# Patient Record
(demographics unavailable — no encounter records)

---

## 2024-11-11 NOTE — REVIEW OF SYSTEMS
[Recent Change In Weight] : ~T recent weight change [Negative] : Heme/Lymph [Fever] : no fever [Chills] : no chills [Fatigue] : no fatigue [FreeTextEntry2] : gained some wgt in past but now losing wgt again (wgt got to 173 on home scale and home scale ths am said 164) ; on GLP1 agonist med [FreeTextEntry8] : +vag dryness, has Rx for vag estrogen cream and used in past when was rx'ed and did not think did much but perhaps did not use as consistently as should so will retry and also will f/u with Dr. Rodriguez in near future for exam

## 2024-11-11 NOTE — ASSESSMENT
[FreeTextEntry1] : ANN-MARIE FONSECA is a 65 year old female here for a physical exam.  She is also here to follow up on medical issues as noted above.  Ann-Marie has HTN, h/o bariatric surgery, intermittent mild leukopenia, osteopenia, low vit D.

## 2024-11-11 NOTE — HISTORY OF PRESENT ILLNESS
[FreeTextEntry1] : JIMMY FONSECA is a 65 year old female here for a physical exam. [de-identified] : Her last physical exam was last year  Vaccines: Tetanus is up to date, Tdap 10/2023 Pneumococcal vaccination is due now that she is 65 yrs of age Shingrix is up to date  Her last dentist visit was within past 6-12 months Her last eye doctor appointment was less than one year ago Her last dermatologist visit was less than one year ago  GYN visit is up to date though due again now, 11/2023, [facility] Mammogram is up to date, 12/2023 stable/benign mammo/US Colon cancer screening is up to date, colonoscopy 2/2023 polyp, rpt at 3 yrs (2/2026), Dr. Curry DEXA is NOT up to date, 2/2022 -1.1 hip (tot and fem neck) lowest T score  Her diet is healthy overall Exercise: [place] [employer], strength training 2x/wk, walking daily   Ann-Marie has HTN, h/o bariatric surgery, intermittent mild leukopenia, osteopenia, low vit D.  She sees Dr. Dalton periodically for eval and testing (last in 2020 she thinks). Baseline ECG PRWP, SB. She is UTD on follow up with Dr. Polk.  At her visit 10/2023  HDL 80 10 yr ASCVD risk est 6.67%. Recommended Mediterranean style eating and exercise. And rec nonurgent eval with Dr. Dalton to update testing and clarify/inform LDL goal/lipid treatment plan. SHe is planning to see her in 2025 sometime  She used Phentermine in 2023 for assistance with wgt loss. Rx'ed by Dr. Polk. BP/HR were higher while on Phentermine. In 12/15/2023 we Increased her Losartan to 50 BID as BPs were elevated and added amlodipine; she has been taking as 50 mg x 2 tabs QAM along with Amlodipine QAM and we are discussing trying to shift some of her BP med to evening. She stopped phentermine in Fall 2023.   Found weight was steadily increasing despite efforts with cleane eating/exercise, nutrition consultation etc. Was concerned about all the wgt she had lost in the past coming back on. She was constantly thinking about food. Saw Dr. Polk and started  GLP1 agonist med, compounded semaglutide through Dr. Polk's office. Will be changing back to Tirzepatide again this week as that is more effective for her. Tolerating well other than maybe a hint of heartburn sometimes but resolves quickly without need for meds

## 2024-11-11 NOTE — PLAN
[FreeTextEntry1] : Continue all medications as prescribed; she will try taking Losartan 50 mg BID instead of 100 mg all at once. Check labs as above. Will adjust any medications based upon lab results.  Reviewed age-appropriate preventive screening tests with patient. UTD but due again now for gyn visit and she will schedule for near future. UTD but due again soon for mammogram and she will schedule for near future. UTD on colonoscopy screening. Due for DEXA screening and will schedule for near future.   Reviewed/recommended annual flu vaccine, and PCV 20 vaccine. She consents to reg strength flu vacc this year, amd PCV 20 today   ECG shows SR, first degree Av block, old anteroseptal infacrt pattern, stable from prior ECGs  She will plan to see Dr. Dalton nonurgently in 2025 for updated eval given card RFs and has been 5 yrs since last eval. NO current card sxs.   BP goal is <=135/85 on average on home checks. Advised to let us know if BPs are above goal on home checks.   Lifestyle measures to optimize BP reviewed, including regular exercise, adequate sleep, Mediterranean or DASH style clean eating, mindfulness/meditation, magnesium 100-400 mg supplementation, avoid NSAIDS, stress management techniques etc.  Discussed clean eating (eg Mediterranean style plant based eating plan) and regular exercise/staying as physically active as possible.  Include balance exercises and strength training and core strengthening exercises for bone health and to decrease risk for falls.  Recommended calcium 6996-0556 mg daily ideally mainly or fully from food sources +/- supplement if needed, vit D 7888-2329 IU or whatever dose is needed to get D into 30-80 range. Recommended regular weight bearing exercise as well as strength training exercise 3 or more times per week and balance exercises regularly.  Reviewed importance of good self care (e.g. meditation, yoga, adequate rest, regular exercise, magnesium, clean eating, etc.).  Follow up with specialists as recommended by them.   Follow up for next physical in one year. Schedule RPA visit (HTN etc) for about 6 month and rpt labs at that time.

## 2024-11-11 NOTE — HISTORY OF PRESENT ILLNESS
[FreeTextEntry1] : JIMMY FONSECA is a 65 year old female here for a physical exam. [de-identified] : Her last physical exam was last year  Vaccines: Tetanus is up to date, Tdap 10/2023 Pneumococcal vaccination is due now that she is 65 yrs of age Shingrix is up to date  Her last dentist visit was within past 6-12 months Her last eye doctor appointment was less than one year ago Her last dermatologist visit was less than one year ago  GYN visit is up to date though due again now, 11/2023, [facility] Mammogram is up to date, 12/2023 stable/benign mammo/US Colon cancer screening is up to date, colonoscopy 2/2023 polyp, rpt at 3 yrs (2/2026), Dr. Curry DEXA is NOT up to date, 2/2022 -1.1 hip (tot and fem neck) lowest T score  Her diet is healthy overall Exercise: [place] [employer], strength training 2x/wk, walking daily   Ann-Marie has HTN, h/o bariatric surgery, intermittent mild leukopenia, osteopenia, low vit D.  She sees Dr. Dalton periodically for eval and testing (last in 2020 she thinks). Baseline ECG PRWP, SB. She is UTD on follow up with Dr. Polk.  At her visit 10/2023  HDL 80 10 yr ASCVD risk est 6.67%. Recommended Mediterranean style eating and exercise. And rec nonurgent eval with Dr. Dalton to update testing and clarify/inform LDL goal/lipid treatment plan. SHe is planning to see her in 2025 sometime  She used Phentermine in 2023 for assistance with wgt loss. Rx'ed by Dr. Polk. BP/HR were higher while on Phentermine. In 12/15/2023 we Increased her Losartan to 50 BID as BPs were elevated and added amlodipine; she has been taking as 50 mg x 2 tabs QAM along with Amlodipine QAM and we are discussing trying to shift some of her BP med to evening. She stopped phentermine in Fall 2023.   Found weight was steadily increasing despite efforts with cleane eating/exercise, nutrition consultation etc. Was concerned about all the wgt she had lost in the past coming back on. She was constantly thinking about food. Saw Dr. Polk and started  GLP1 agonist med, compounded semaglutide through Dr. Polk's office. Will be changing back to Tirzepatide again this week as that is more effective for her. Tolerating well other than maybe a hint of heartburn sometimes but resolves quickly without need for meds

## 2024-11-11 NOTE — HEALTH RISK ASSESSMENT
[No falls in past year] : Patient reported no falls in the past year [0] : 2) Feeling down, depressed, or hopeless: Not at all (0) [PHQ-2 Negative - No further assessment needed] : PHQ-2 Negative - No further assessment needed [Never] : Never [[id number]] : 0

## 2024-11-11 NOTE — PHYSICAL EXAM
[No Acute Distress] : no acute distress [Well Developed] : well developed [Well-Appearing] : well-appearing [Normal Sclera/Conjunctiva] : normal sclera/conjunctiva [EOMI] : extraocular movements intact [Normal Outer Ear/Nose] : the outer ears and nose were normal in appearance [Normal Oropharynx] : the oropharynx was normal [No JVD] : no jugular venous distention [No Lymphadenopathy] : no lymphadenopathy [Supple] : supple [Thyroid Normal, No Nodules] : the thyroid was normal and there were no nodules present [No Respiratory Distress] : no respiratory distress  [No Accessory Muscle Use] : no accessory muscle use [Clear to Auscultation] : lungs were clear to auscultation bilaterally [Normal Rate] : normal rate  [Regular Rhythm] : with a regular rhythm [Normal S1, S2] : normal S1 and S2 [No Carotid Bruits] : no carotid bruits [Pedal Pulses Present] : the pedal pulses are present [No Varicosities] : no varicosities [No Edema] : there was no peripheral edema [No Extremity Clubbing/Cyanosis] : no extremity clubbing/cyanosis [Soft] : abdomen soft [Non Tender] : non-tender [Non-distended] : non-distended [No Masses] : no abdominal mass palpated [No HSM] : no HSM [Normal Bowel Sounds] : normal bowel sounds [Normal Posterior Cervical Nodes] : no posterior cervical lymphadenopathy [Normal Anterior Cervical Nodes] : no anterior cervical lymphadenopathy [No Joint Swelling] : no joint swelling [Grossly Normal Strength/Tone] : grossly normal strength/tone [No Rash] : no rash [Coordination Grossly Intact] : coordination grossly intact [No Focal Deficits] : no focal deficits [Normal Gait] : normal gait [Normal Affect] : the affect was normal [Normal Insight/Judgement] : insight and judgment were intact [de-identified] : BMI 28, up on our scale from 1/2024 though on decreasing trend on her home scale in past few mos [de-identified] : 2/6 soft systolic murmur heard along LSB (stable for her), HR wnl today

## 2024-11-11 NOTE — REVIEW OF SYSTEMS
[Recent Change In Weight] : ~T recent weight change [Negative] : Heme/Lymph [Fever] : no fever [Chills] : no chills [Fatigue] : no fatigue [FreeTextEntry2] : gained some wgt in past but now losing wgt again (wgt got to 173 on home scale and home scale ths am said 164) ; on GLP1 agonist med [FreeTextEntry8] : +vag dryness, has Rx for vag estrogen cream and used in past when was rx'ed and did not think did much but perhaps did not use as consistently as should so will retry and also will f/u with Dr. Rodirguez in near future for exam

## 2024-11-11 NOTE — HEALTH RISK ASSESSMENT
[No falls in past year] : Patient reported no falls in the past year [0] : 2) Feeling down, depressed, or hopeless: Not at all (0) [PHQ-2 Negative - No further assessment needed] : PHQ-2 Negative - No further assessment needed [Never] : Never [[id number]] : 0

## 2024-11-11 NOTE — PHYSICAL EXAM
[No Acute Distress] : no acute distress [Well Developed] : well developed [Well-Appearing] : well-appearing [Normal Sclera/Conjunctiva] : normal sclera/conjunctiva [EOMI] : extraocular movements intact [Normal Outer Ear/Nose] : the outer ears and nose were normal in appearance [Normal Oropharynx] : the oropharynx was normal [No JVD] : no jugular venous distention [No Lymphadenopathy] : no lymphadenopathy [Supple] : supple [Thyroid Normal, No Nodules] : the thyroid was normal and there were no nodules present [No Respiratory Distress] : no respiratory distress  [No Accessory Muscle Use] : no accessory muscle use [Clear to Auscultation] : lungs were clear to auscultation bilaterally [Normal Rate] : normal rate  [Regular Rhythm] : with a regular rhythm [Normal S1, S2] : normal S1 and S2 [No Carotid Bruits] : no carotid bruits [No Varicosities] : no varicosities [Pedal Pulses Present] : the pedal pulses are present [No Edema] : there was no peripheral edema [No Extremity Clubbing/Cyanosis] : no extremity clubbing/cyanosis [Soft] : abdomen soft [Non Tender] : non-tender [Non-distended] : non-distended [No Masses] : no abdominal mass palpated [No HSM] : no HSM [Normal Bowel Sounds] : normal bowel sounds [Normal Posterior Cervical Nodes] : no posterior cervical lymphadenopathy [Normal Anterior Cervical Nodes] : no anterior cervical lymphadenopathy [No Joint Swelling] : no joint swelling [Grossly Normal Strength/Tone] : grossly normal strength/tone [No Rash] : no rash [Coordination Grossly Intact] : coordination grossly intact [No Focal Deficits] : no focal deficits [Normal Gait] : normal gait [Normal Affect] : the affect was normal [Normal Insight/Judgement] : insight and judgment were intact [de-identified] : BMI 28, up on our scale from 1/2024 though on decreasing trend on her home scale in past few mos [de-identified] : 2/6 soft systolic murmur heard along LSB (stable for her), HR wnl today

## 2024-11-11 NOTE — PLAN
Detail Level: Detailed Quality 130: Documentation Of Current Medications In The Medical Record: Current Medications Documented Quality 111:Pneumonia Vaccination Status For Older Adults: Pneumococcal Vaccination Previously Received [FreeTextEntry1] : Continue all medications as prescribed; she will try taking Losartan 50 mg BID instead of 100 mg all at once. Check labs as above. Will adjust any medications based upon lab results.  Reviewed age-appropriate preventive screening tests with patient. UTD but due again now for gyn visit and she will schedule for near future. UTD but due again soon for mammogram and she will schedule for near future. UTD on colonoscopy screening. Due for DEXA screening and will schedule for near future.   Reviewed/recommended annual flu vaccine, and PCV 20 vaccine. She consents to reg strength flu vacc this year, amd PCV 20 today   ECG shows SR, first degree Av block, old anteroseptal infacrt pattern, stable from prior ECGs  She will plan to see Dr. Dalton nonurgently in 2025 for updated eval given card RFs and has been 5 yrs since last eval. NO current card sxs.   BP goal is <=135/85 on average on home checks. Advised to let us know if BPs are above goal on home checks.   Lifestyle measures to optimize BP reviewed, including regular exercise, adequate sleep, Mediterranean or DASH style clean eating, mindfulness/meditation, magnesium 100-400 mg supplementation, avoid NSAIDS, stress management techniques etc.  Discussed clean eating (eg Mediterranean style plant based eating plan) and regular exercise/staying as physically active as possible.  Include balance exercises and strength training and core strengthening exercises for bone health and to decrease risk for falls.  Recommended calcium 2604-1656 mg daily ideally mainly or fully from food sources +/- supplement if needed, vit D 9676-0942 IU or whatever dose is needed to get D into 30-80 range. Recommended regular weight bearing exercise as well as strength training exercise 3 or more times per week and balance exercises regularly.  Reviewed importance of good self care (e.g. meditation, yoga, adequate rest, regular exercise, magnesium, clean eating, etc.).  Follow up with specialists as recommended by them.   Follow up for next physical in one year. Schedule RPA visit (HTN etc) for about 6 month and rpt labs at that time.

## 2025-05-13 NOTE — HISTORY OF PRESENT ILLNESS
[FreeTextEntry1] : Follow up. [de-identified] : Patient is a 65yo female with PMH HTN, HLD, thrombocytopenia, leukopenia, s/p gastric sleeve who presents to the office for follow up.   HTN:  pt currently taking Losartan 50mg BID, Amlodipine 5mg daily.  Pt does not regularly check BP at home, but when she does check sporadically it has been normal.  HLD:  , ASCVD risk 7.86% in 11/2024.  Lifestyle modifications, cardiology f/u encouraged at that time.   Cardiology:  Dr. Dalton.  Had recent cardiac work up (echo, stress test) which was reportedly normal.  Pt had b/w done with bariatric surgery, Dr. Polk, in 03/2025.  LDL improved to 99.   Chronically elevated Tbili and leukopenia, stable.

## 2025-05-13 NOTE — ASSESSMENT
[FreeTextEntry1] : Patient is a 65yo female with PMH HTN, HLD, thrombocytopenia, leukopenia, s/p gastric sleeve who presents to the office for follow up.  HTN - Initial BP borderline, rpt improved. - Continue Losartan 50mg BID, Amlodipine 5mg daily. - Monitor blood pressure at home, keep log, alert office if too high/too low. - DASH diet encouraged, regular exercise encouraged. - Alert office if you develop any new, worsening or concerning symptoms including headache, vision changes, dizziness, loss of consciousness, chest pain, shortness of breath, or lower extremity edema.  HLD - B/w from Dr. Polk in 03/2025 reviewed. - LDL improved to 99. - Continue Rosuvastatin 5mg daily. - ASCVD risk 6.49%, calculated from labs performed 03/2025. - ASCVD risk is below the 7.5% threshold where a statin medication is recommended.  - The patient was encouraged to continue aggressive risk factor modification for cardiovascular disease and counseled about these efforts at length.  Efforts discussed include healthier eating habits and incorporation of regular exercise. - Limit/avoid greasy foods, fried foods, fatty foods, and saturated fat in your diet and try and eat more lean proteins.  Pt has CPE scheduled 12/2025 with Dr. TERRY Sloan.  Grandchild due around the same time, will be trying to reschedule appointment.  Call the office or go to the ED immediately if you develop new, worsening or concerning symptoms including high fever, severe headache/worst headache of your life, confusion, dizziness/lightheadedness, loss of consciousness, severe chest pain, difficulty breathing, shortness of breath, severe abdominal pain, excessive vomiting/diarrhea, inability to feel/move the extremities, or any other concerning symptoms.

## 2025-05-13 NOTE — HEALTH RISK ASSESSMENT
[Yes] : Yes [2 - 4 times a month (2 pts)] : 2-4 times a month (2 points) [1 or 2 (0 pts)] : 1 or 2 (0 points) [Never (0 pts)] : Never (0 points) [No] : In the past 12 months have you used drugs other than those required for medical reasons? No [No falls in past year] : Patient reported no falls in the past year [0] : 2) Feeling down, depressed, or hopeless: Not at all (0) [PHQ-2 Negative - No further assessment needed] : PHQ-2 Negative - No further assessment needed [Never] : Never [Audit-CScore] : 2 [de-identified] : exercises regularly [de-identified] : well balanced [[id number]] : 0